# Patient Record
Sex: FEMALE | Race: BLACK OR AFRICAN AMERICAN | NOT HISPANIC OR LATINO | URBAN - METROPOLITAN AREA
[De-identification: names, ages, dates, MRNs, and addresses within clinical notes are randomized per-mention and may not be internally consistent; named-entity substitution may affect disease eponyms.]

---

## 2020-09-18 ENCOUNTER — INPATIENT (INPATIENT)
Facility: HOSPITAL | Age: 25
LOS: 0 days | Discharge: HOME | End: 2020-09-19
Attending: OBSTETRICS & GYNECOLOGY | Admitting: OBSTETRICS & GYNECOLOGY

## 2020-09-18 VITALS — TEMPERATURE: 98 F

## 2020-09-18 DIAGNOSIS — O26.899 OTHER SPECIFIED PREGNANCY RELATED CONDITIONS, UNSPECIFIED TRIMESTER: ICD-10-CM

## 2020-09-18 DIAGNOSIS — Z98.890 OTHER SPECIFIED POSTPROCEDURAL STATES: Chronic | ICD-10-CM

## 2020-09-18 DIAGNOSIS — Z3A.00 WEEKS OF GESTATION OF PREGNANCY NOT SPECIFIED: ICD-10-CM

## 2020-09-18 LAB
ALBUMIN SERPL ELPH-MCNC: 3.8 G/DL — SIGNIFICANT CHANGE UP (ref 3.3–5)
ALP SERPL-CCNC: 168 U/L — HIGH (ref 40–120)
ALT FLD-CCNC: 14 U/L — SIGNIFICANT CHANGE UP (ref 4–33)
ANION GAP SERPL CALC-SCNC: 15 MMO/L — HIGH (ref 7–14)
APPEARANCE UR: CLEAR — SIGNIFICANT CHANGE UP
APTT BLD: 28.3 SEC — SIGNIFICANT CHANGE UP (ref 27–36.3)
AST SERPL-CCNC: 21 U/L — SIGNIFICANT CHANGE UP (ref 4–32)
BACTERIA # UR AUTO: SIGNIFICANT CHANGE UP
BASOPHILS # BLD AUTO: 0.02 K/UL — SIGNIFICANT CHANGE UP (ref 0–0.2)
BASOPHILS NFR BLD AUTO: 0.2 % — SIGNIFICANT CHANGE UP (ref 0–2)
BILIRUB SERPL-MCNC: 0.2 MG/DL — SIGNIFICANT CHANGE UP (ref 0.2–1.2)
BILIRUB UR-MCNC: NEGATIVE — SIGNIFICANT CHANGE UP
BLD GP AB SCN SERPL QL: NEGATIVE — SIGNIFICANT CHANGE UP
BLOOD UR QL VISUAL: NEGATIVE — SIGNIFICANT CHANGE UP
BUN SERPL-MCNC: 10 MG/DL — SIGNIFICANT CHANGE UP (ref 7–23)
CALCIUM SERPL-MCNC: 9.3 MG/DL — SIGNIFICANT CHANGE UP (ref 8.4–10.5)
CHLORIDE SERPL-SCNC: 101 MMOL/L — SIGNIFICANT CHANGE UP (ref 98–107)
CO2 SERPL-SCNC: 20 MMOL/L — LOW (ref 22–31)
COLOR SPEC: YELLOW — SIGNIFICANT CHANGE UP
CREAT ?TM UR-MCNC: 268.9 MG/DL — SIGNIFICANT CHANGE UP
CREAT SERPL-MCNC: 0.58 MG/DL — SIGNIFICANT CHANGE UP (ref 0.5–1.3)
EOSINOPHIL # BLD AUTO: 0.04 K/UL — SIGNIFICANT CHANGE UP (ref 0–0.5)
EOSINOPHIL NFR BLD AUTO: 0.4 % — SIGNIFICANT CHANGE UP (ref 0–6)
FIBRINOGEN PPP-MCNC: 799 MG/DL — HIGH (ref 290–520)
GLUCOSE SERPL-MCNC: 88 MG/DL — SIGNIFICANT CHANGE UP (ref 70–99)
GLUCOSE UR-MCNC: NEGATIVE — SIGNIFICANT CHANGE UP
HCT VFR BLD CALC: 35 % — SIGNIFICANT CHANGE UP (ref 34.5–45)
HGB BLD-MCNC: 10.9 G/DL — LOW (ref 11.5–15.5)
HYALINE CASTS # UR AUTO: HIGH
IMM GRANULOCYTES NFR BLD AUTO: 0.6 % — SIGNIFICANT CHANGE UP (ref 0–1.5)
INR BLD: 0.98 — SIGNIFICANT CHANGE UP (ref 0.88–1.16)
KETONES UR-MCNC: SIGNIFICANT CHANGE UP
LDH SERPL L TO P-CCNC: 205 U/L — SIGNIFICANT CHANGE UP (ref 135–225)
LEUKOCYTE ESTERASE UR-ACNC: NEGATIVE — SIGNIFICANT CHANGE UP
LYMPHOCYTES # BLD AUTO: 1.88 K/UL — SIGNIFICANT CHANGE UP (ref 1–3.3)
LYMPHOCYTES # BLD AUTO: 20.2 % — SIGNIFICANT CHANGE UP (ref 13–44)
MCHC RBC-ENTMCNC: 25.5 PG — LOW (ref 27–34)
MCHC RBC-ENTMCNC: 31.1 % — LOW (ref 32–36)
MCV RBC AUTO: 82 FL — SIGNIFICANT CHANGE UP (ref 80–100)
MONOCYTES # BLD AUTO: 0.89 K/UL — SIGNIFICANT CHANGE UP (ref 0–0.9)
MONOCYTES NFR BLD AUTO: 9.5 % — SIGNIFICANT CHANGE UP (ref 2–14)
NEUTROPHILS # BLD AUTO: 6.43 K/UL — SIGNIFICANT CHANGE UP (ref 1.8–7.4)
NEUTROPHILS NFR BLD AUTO: 69.1 % — SIGNIFICANT CHANGE UP (ref 43–77)
NITRITE UR-MCNC: NEGATIVE — SIGNIFICANT CHANGE UP
NRBC # FLD: 0 K/UL — SIGNIFICANT CHANGE UP (ref 0–0)
PH UR: 6.5 — SIGNIFICANT CHANGE UP (ref 5–8)
PLATELET # BLD AUTO: 156 K/UL — SIGNIFICANT CHANGE UP (ref 150–400)
PMV BLD: 13.5 FL — HIGH (ref 7–13)
POTASSIUM SERPL-MCNC: 3.8 MMOL/L — SIGNIFICANT CHANGE UP (ref 3.5–5.3)
POTASSIUM SERPL-SCNC: 3.8 MMOL/L — SIGNIFICANT CHANGE UP (ref 3.5–5.3)
PROT SERPL-MCNC: 6.9 G/DL — SIGNIFICANT CHANGE UP (ref 6–8.3)
PROT UR-MCNC: 52.2 MG/DL — SIGNIFICANT CHANGE UP
PROT UR-MCNC: 70 — SIGNIFICANT CHANGE UP
PROTHROM AB SERPL-ACNC: 11.3 SEC — SIGNIFICANT CHANGE UP (ref 10.6–13.6)
RBC # BLD: 4.27 M/UL — SIGNIFICANT CHANGE UP (ref 3.8–5.2)
RBC # FLD: 16.1 % — HIGH (ref 10.3–14.5)
RBC CASTS # UR COMP ASSIST: SIGNIFICANT CHANGE UP (ref 0–?)
RH IG SCN BLD-IMP: POSITIVE — SIGNIFICANT CHANGE UP
RH IG SCN BLD-IMP: POSITIVE — SIGNIFICANT CHANGE UP
RUBV IGG SER-ACNC: 1.3 INDEX — SIGNIFICANT CHANGE UP
RUBV IGG SER-IMP: POSITIVE — SIGNIFICANT CHANGE UP
SARS-COV-2 RNA SPEC QL NAA+PROBE: SIGNIFICANT CHANGE UP
SODIUM SERPL-SCNC: 136 MMOL/L — SIGNIFICANT CHANGE UP (ref 135–145)
SP GR SPEC: 1.04 — SIGNIFICANT CHANGE UP (ref 1–1.04)
SQUAMOUS # UR AUTO: SIGNIFICANT CHANGE UP
T PALLIDUM AB TITR SER: NEGATIVE — SIGNIFICANT CHANGE UP
URATE SERPL-MCNC: 4.6 MG/DL — SIGNIFICANT CHANGE UP (ref 2.5–7)
UROBILINOGEN FLD QL: SIGNIFICANT CHANGE UP
WBC # BLD: 9.32 K/UL — SIGNIFICANT CHANGE UP (ref 3.8–10.5)
WBC # FLD AUTO: 9.32 K/UL — SIGNIFICANT CHANGE UP (ref 3.8–10.5)
WBC UR QL: SIGNIFICANT CHANGE UP (ref 0–?)

## 2020-09-18 RX ORDER — SODIUM CHLORIDE 9 MG/ML
1000 INJECTION, SOLUTION INTRAVENOUS
Refills: 0 | Status: DISCONTINUED | OUTPATIENT
Start: 2020-09-18 | End: 2020-09-18

## 2020-09-18 RX ORDER — IBUPROFEN 200 MG
600 TABLET ORAL EVERY 6 HOURS
Refills: 0 | Status: COMPLETED | OUTPATIENT
Start: 2020-09-18 | End: 2021-08-17

## 2020-09-18 RX ORDER — TETANUS TOXOID, REDUCED DIPHTHERIA TOXOID AND ACELLULAR PERTUSSIS VACCINE, ADSORBED 5; 2.5; 8; 8; 2.5 [IU]/.5ML; [IU]/.5ML; UG/.5ML; UG/.5ML; UG/.5ML
0.5 SUSPENSION INTRAMUSCULAR ONCE
Refills: 0 | Status: DISCONTINUED | OUTPATIENT
Start: 2020-09-18 | End: 2020-09-19

## 2020-09-18 RX ORDER — ACETAMINOPHEN 500 MG
975 TABLET ORAL
Refills: 0 | Status: DISCONTINUED | OUTPATIENT
Start: 2020-09-18 | End: 2020-09-19

## 2020-09-18 RX ORDER — LANOLIN
1 OINTMENT (GRAM) TOPICAL EVERY 6 HOURS
Refills: 0 | Status: DISCONTINUED | OUTPATIENT
Start: 2020-09-18 | End: 2020-09-19

## 2020-09-18 RX ORDER — DIBUCAINE 1 %
1 OINTMENT (GRAM) RECTAL EVERY 6 HOURS
Refills: 0 | Status: DISCONTINUED | OUTPATIENT
Start: 2020-09-18 | End: 2020-09-19

## 2020-09-18 RX ORDER — KETOROLAC TROMETHAMINE 30 MG/ML
30 SYRINGE (ML) INJECTION ONCE
Refills: 0 | Status: DISCONTINUED | OUTPATIENT
Start: 2020-09-18 | End: 2020-09-18

## 2020-09-18 RX ORDER — OXYCODONE HYDROCHLORIDE 5 MG/1
5 TABLET ORAL
Refills: 0 | Status: DISCONTINUED | OUTPATIENT
Start: 2020-09-18 | End: 2020-09-19

## 2020-09-18 RX ORDER — HYDROCORTISONE 1 %
1 OINTMENT (GRAM) TOPICAL EVERY 6 HOURS
Refills: 0 | Status: DISCONTINUED | OUTPATIENT
Start: 2020-09-18 | End: 2020-09-19

## 2020-09-18 RX ORDER — AMPICILLIN TRIHYDRATE 250 MG
2 CAPSULE ORAL ONCE
Refills: 0 | Status: COMPLETED | OUTPATIENT
Start: 2020-09-18 | End: 2020-09-18

## 2020-09-18 RX ORDER — SIMETHICONE 80 MG/1
80 TABLET, CHEWABLE ORAL EVERY 4 HOURS
Refills: 0 | Status: DISCONTINUED | OUTPATIENT
Start: 2020-09-18 | End: 2020-09-19

## 2020-09-18 RX ORDER — SODIUM CHLORIDE 9 MG/ML
3 INJECTION INTRAMUSCULAR; INTRAVENOUS; SUBCUTANEOUS EVERY 8 HOURS
Refills: 0 | Status: DISCONTINUED | OUTPATIENT
Start: 2020-09-18 | End: 2020-09-19

## 2020-09-18 RX ORDER — BENZOCAINE 10 %
1 GEL (GRAM) MUCOUS MEMBRANE EVERY 6 HOURS
Refills: 0 | Status: DISCONTINUED | OUTPATIENT
Start: 2020-09-18 | End: 2020-09-19

## 2020-09-18 RX ORDER — IBUPROFEN 200 MG
600 TABLET ORAL EVERY 6 HOURS
Refills: 0 | Status: DISCONTINUED | OUTPATIENT
Start: 2020-09-18 | End: 2020-09-19

## 2020-09-18 RX ORDER — IBUPROFEN 200 MG
1 TABLET ORAL
Qty: 0 | Refills: 0 | DISCHARGE
Start: 2020-09-18

## 2020-09-18 RX ORDER — INFLUENZA VIRUS VACCINE 15; 15; 15; 15 UG/.5ML; UG/.5ML; UG/.5ML; UG/.5ML
0.5 SUSPENSION INTRAMUSCULAR ONCE
Refills: 0 | Status: DISCONTINUED | OUTPATIENT
Start: 2020-09-18 | End: 2020-09-19

## 2020-09-18 RX ORDER — OXYCODONE HYDROCHLORIDE 5 MG/1
5 TABLET ORAL ONCE
Refills: 0 | Status: DISCONTINUED | OUTPATIENT
Start: 2020-09-18 | End: 2020-09-19

## 2020-09-18 RX ORDER — OXYTOCIN 10 UNIT/ML
333.33 VIAL (ML) INJECTION
Qty: 20 | Refills: 0 | Status: DISCONTINUED | OUTPATIENT
Start: 2020-09-18 | End: 2020-09-19

## 2020-09-18 RX ORDER — AMPICILLIN TRIHYDRATE 250 MG
1 CAPSULE ORAL EVERY 4 HOURS
Refills: 0 | Status: DISCONTINUED | OUTPATIENT
Start: 2020-09-18 | End: 2020-09-18

## 2020-09-18 RX ORDER — ACETAMINOPHEN 500 MG
3 TABLET ORAL
Qty: 0 | Refills: 0 | DISCHARGE
Start: 2020-09-18

## 2020-09-18 RX ORDER — PRAMOXINE HYDROCHLORIDE 150 MG/15G
1 AEROSOL, FOAM RECTAL EVERY 4 HOURS
Refills: 0 | Status: DISCONTINUED | OUTPATIENT
Start: 2020-09-18 | End: 2020-09-19

## 2020-09-18 RX ORDER — AER TRAVELER 0.5 G/1
1 SOLUTION RECTAL; TOPICAL EVERY 4 HOURS
Refills: 0 | Status: DISCONTINUED | OUTPATIENT
Start: 2020-09-18 | End: 2020-09-19

## 2020-09-18 RX ORDER — DIPHENHYDRAMINE HCL 50 MG
25 CAPSULE ORAL EVERY 6 HOURS
Refills: 0 | Status: DISCONTINUED | OUTPATIENT
Start: 2020-09-18 | End: 2020-09-19

## 2020-09-18 RX ORDER — MAGNESIUM HYDROXIDE 400 MG/1
30 TABLET, CHEWABLE ORAL
Refills: 0 | Status: DISCONTINUED | OUTPATIENT
Start: 2020-09-18 | End: 2020-09-19

## 2020-09-18 RX ADMIN — AER TRAVELER 1 APPLICATION(S): 0.5 SOLUTION RECTAL; TOPICAL at 23:38

## 2020-09-18 RX ADMIN — Medication 1 APPLICATION(S): at 23:38

## 2020-09-18 RX ADMIN — Medication 1000 MILLIUNIT(S)/MIN: at 19:50

## 2020-09-18 RX ADMIN — Medication 108 GRAM(S): at 15:06

## 2020-09-18 RX ADMIN — SODIUM CHLORIDE 125 MILLILITER(S): 9 INJECTION, SOLUTION INTRAVENOUS at 07:06

## 2020-09-18 RX ADMIN — Medication 216 GRAM(S): at 07:06

## 2020-09-18 RX ADMIN — Medication 1 SPRAY(S): at 23:38

## 2020-09-18 RX ADMIN — Medication 30 MILLIGRAM(S): at 19:50

## 2020-09-18 RX ADMIN — Medication 30 MILLIGRAM(S): at 20:20

## 2020-09-18 RX ADMIN — Medication 108 GRAM(S): at 18:55

## 2020-09-18 RX ADMIN — PRAMOXINE HYDROCHLORIDE 1 APPLICATION(S): 150 AEROSOL, FOAM RECTAL at 23:39

## 2020-09-18 RX ADMIN — SODIUM CHLORIDE 3 MILLILITER(S): 9 INJECTION INTRAMUSCULAR; INTRAVENOUS; SUBCUTANEOUS at 22:47

## 2020-09-18 RX ADMIN — Medication 108 GRAM(S): at 10:54

## 2020-09-18 RX ADMIN — SODIUM CHLORIDE 125 MILLILITER(S): 9 INJECTION, SOLUTION INTRAVENOUS at 10:54

## 2020-09-18 NOTE — OB PROVIDER TRIAGE NOTE - NSHPPHYSICALEXAM_GEN_ALL_CORE
Vital Signs Last 24 Hrs  T(C): 36.8 (18 Sep 2020 06:46), Max: 36.8 (18 Sep 2020 06:39)  T(F): 98.2 (18 Sep 2020 06:46), Max: 98.24 (18 Sep 2020 06:39)  HR: 72 (18 Sep 2020 06:55) (72 - 75)  BP: 121/63 (18 Sep 2020 06:55) (121/63 - 139/84)  RR: 17 (18 Sep 2020 06:46) (17 - 17)  TAS: cephalic presentation  FHR: 135 baseline, minimum variability, no accelerations, no decelerations  CTX: 3 to 4 minutes apart  SVE: 4/90/-3  TAS: cephalic presentation

## 2020-09-18 NOTE — DISCHARGE NOTE OB - PATIENT PORTAL LINK FT
You can access the FollowMyHealth Patient Portal offered by [facility] by registering at the following website: [email or web address]. By joining [employer]’s FollowMyHealth portal, you will also be able to view your health information using other applications (apps) compatible with our system.

## 2020-09-18 NOTE — OB PROVIDER DELIVERY SUMMARY - NSPROVIDERDELIVERYNOTE_OBGYN_ALL_OB_FT
pt fully dilated with urge to push  pushed w/ contractions  head delivered in PANCHO position, no nuchal cord noted  anterior shoulder delivered, followed by posterior shoulder and body  mouth suctioned, baby placed onto mother's abdomen  cord clamped x 2 and cut  baby handed to awaiting pediatricians  placenta delivered intact, uterus firmed at umbilical level, pitocin started IV  1st degrees vaginal laceration repaired w lidocaine and chromic suture    baby girl, apgars 9/9, weight 3375g, ebl 200cc

## 2020-09-18 NOTE — DISCHARGE NOTE OB - MEDICATION SUMMARY - MEDICATIONS TO TAKE
I will START or STAY ON the medications listed below when I get home from the hospital:    acetaminophen 325 mg oral tablet  -- 3 tab(s) by mouth   -- Indication: For vaginal delivery    ibuprofen 600 mg oral tablet  -- 1 tab(s) by mouth every 6 hours  -- Indication: For vaginal delivery    PNV Prenatal oral tablet  -- 1 tab(s) by mouth once a day  -- Indication: For vaginal delivery   I will START or STAY ON the medications listed below when I get home from the hospital:    ibuprofen 600 mg oral tablet  -- 1 tab(s) by mouth every 6 hours, As Needed  -- Indication: For pain    acetaminophen 325 mg oral tablet  -- 3 tab(s) by mouth , As Needed  -- Indication: For pain    PNV Prenatal oral tablet  -- 1 tab(s) by mouth once a day  -- Indication: For vaginal delivery

## 2020-09-18 NOTE — DISCHARGE NOTE OB - CARE PLAN
Principal Discharge DX:	Vaginal delivery  Goal:	post partum recovery  Assessment and plan of treatment:	23 y/o now P1 admitted in labor  uncomplicated labor course and delivery  uncomplicated post partum course  d/c home stable on PPD#1

## 2020-09-18 NOTE — CHART NOTE - NSCHARTNOTEFT_GEN_A_CORE
Patient seen and checked at bedside.     SVE: 7.5/90/-1.   FHR: 145/moderate variability/-accels/-decels  [place]: irregular     AROM 4:00 pm, light [facility]     ABRAN Wilson, PGY1  D/w Dr. Mejia

## 2020-09-18 NOTE — OB RN DELIVERY SUMMARY - NS_SEPSISRSKCALC_OBGYN_ALL_OB_FT
EOS calculated successfully. EOS Risk Factor: 0.5/1000 live births ([facility] national incidence); GA=40w4d; Temp=98.78; ROM=3.317; GBS='Positive'; Antibiotics='GBS specific antibiotics > 2 hrs prior to birth'

## 2020-09-18 NOTE — CHART NOTE - NSCHARTNOTEFT_GEN_A_CORE
Patient seen and evaluated at bedside. She feels slightly more rectal pressure but is overall comfortable.     SVE: 6.5/80/-3  [place]: irregular   FHR: 130/mod obed/-accels/-decels    Plan:   - AROM   - Pitocin to establish regular contraction pattern     ABRAN Wilson, PGY1  D/w Dr. Mejia

## 2020-09-18 NOTE — OB PROVIDER H&P - NSHPPHYSICALEXAM_GEN_ALL_CORE
Vital Signs Last 24 Hrs  T(C): 36.8 (18 Sep 2020 06:46), Max: 36.8 (18 Sep 2020 06:39)  T(F): 98.2 (18 Sep 2020 06:46), Max: 98.24 (18 Sep 2020 06:39)  HR: 72 (18 Sep 2020 06:55) (72 - 75)  BP: 121/63 (18 Sep 2020 06:55) (121/63 - 139/84)  RR: 17 (18 Sep 2020 06:46) (17 - 17)  TAS: cephalic presentation  FHR: 135 baseline, minimum variability, no accelerations, no decelerations  CTX: 3 to 4 minutes apart  SVE: 4/90/-3  TAS: cephalic presentation Vital Signs Last 24 Hrs  T(C): 36.8 (18 Sep 2020 06:46), Max: 36.8 (18 Sep 2020 06:39)  T(F): 98.2 (18 Sep 2020 06:46), Max: 98.24 (18 Sep 2020 06:39)  HR: 72 (18 Sep 2020 06:55) (72 - 75)  BP: 121/63 (18 Sep 2020 06:55) (121/63 - 139/84)  Patient denies symptoms related to PEC.   RR: 17 (18 Sep 2020 06:46) (17 - 17)  TAS: cephalic presentation  FHR: 135 baseline, minimum variability, no accelerations, no decelerations  CTX: 3 to 4 minutes apart  SVE: 4/90/-3  TAS: cephalic presentation

## 2020-09-18 NOTE — OB NEONATOLOGY/PEDIATRICIAN DELIVERY SUMMARY - NSPEDSNEONOTESA_OBGYN_ALL_OB_FT
Pediatrician called to delivery for meconium. Female infant born at 40.4wks via  to a 27 y/o  blood type A+ mother. No significant maternal or prenatal history. Prenatal labs nr/immune/-, GBS + on  treated with amp x4. ROM at 1553 on  with meconium fluids. Baby emerged vigorous, crying. Cord clamping delayed 30 sec. Infant was brought to radiant warmer and warmed, dried, stimulated and suctioned. HR>100, normal respiratory effort. APGARS of 9 and 9. Mom is initiating breast feeding. Consents to Hepatitis B vaccination. EOS score 0.08.     Physical Exam:  Gen: NAD, +grimace  HEENT: anterior fontanel open soft and flat, no cleft lip/palate, ears normal set, no ear pits or tags. no lesions in mouth/throat, nares clinically patent  Resp: no increased work of breathing, good air entry b/l, clear to auscultation bilaterally  Cardio: Normal S1/S2, regular rate and rhythm, no murmurs, rubs or gallops  Abd: soft, non tender, non distended, + bowel sounds, umbilical cord with 3 vessels  Neuro: +grasp/suck/onur, normal tone  Extremities: negative barrios and ortolani, moving all extremities, full range of motion x 4, no crepitus  Skin: pink, warm  Genitals:[Normal female anatomy] [Normal male anatomy, testicles palpable in scrotum b/l], Toni 1, anus patent Pediatrician called to delivery for meconium. Female infant born at 40.4wks via  to a 25 y/o  blood type A+ mother. No significant maternal or prenatal history. Prenatal labs nr/immune/-, GBS + on  treated with amp x4. ROM at 1553 on  with meconium fluids. Baby emerged vigorous, crying. Cord clamping delayed 30 sec. Infant was brought to radiant warmer and warmed, dried, stimulated and suctioned. HR>100, normal respiratory effort. APGARS of 9 and 9. Mom is initiating breast feeding. Consents to Hepatitis B vaccination. EOS score 0.08.     Physical Exam:  Gen: NAD, +grimace  HEENT: anterior fontanel open soft and flat, no cleft lip/palate, ears normal set, no ear pits or tags. no lesions in mouth/throat, nares clinically patent  Resp: no increased work of breathing, good air entry b/l, clear to auscultation bilaterally  Cardio: Normal S1/S2, regular rate and rhythm, no murmurs, rubs or gallops  Abd: soft, non tender, non distended, + bowel sounds, umbilical cord with 3 vessels  Neuro: +grasp/suck/onur, normal tone  Extremities: negative barrios and ortolani, moving all extremities, full range of motion x 4, no crepitus  Skin: pink, warm  Genitals: Normal female anatomy, Toni 1, anus patent

## 2020-09-18 NOTE — DISCHARGE NOTE OB - CARE PROVIDER_API CALL
Kohanim, Behnam  OBSTETRICS AND GYNECOLOGY  [address], [address]  [place] [address]  Phone: [phone]  Fax: [phone]  Follow Up Time:

## 2020-09-18 NOTE — OB PROVIDER TRIAGE NOTE - NSOBPROVIDERNOTE_OBGYN_ALL_OB_FT
Evidence of labor  - admit to labor and delivery, d/w   - GBS positive, for Ampicillin  - patient does not want epidural plain management at this time. Please call  for pain management.

## 2020-09-18 NOTE — OB PROVIDER TRIAGE NOTE - HISTORY OF PRESENT ILLNESS
's patient is a EDC 2020 EGA 40   reports of painful contractions every 5 minutes. Patient reports of fetal movement. Denies loss of fluid, vaginal bleeding.     AP complications: Denies  Medical History: Denies  Surgical History: D&C x 2  OBGYN History: TOP x 2

## 2020-09-18 NOTE — DISCHARGE NOTE OB - PLAN OF CARE
post partum recovery 23 y/o now P1 admitted in labor  uncomplicated labor course and delivery  uncomplicated post partum course  d/c home stable on PPD#1

## 2020-09-18 NOTE — PROGRESS NOTE ADULT - SUBJECTIVE AND OBJECTIVE BOX
23 y/o P0 at 40wks+4/7 admitted in early labor this morning  GBS pos, on ampicillin  efw 3400g    pt w/ epidural in place, comfortable  feeling slight vaginal/rectal pressure    vitals  /68 P85 RR15 T98.4 O2sat 100%RA  FHT 140s/min-mod variability/ pos accels, no decels  toco ctx q 2-3mins  VE 9/90/0, thick mec noted    plan  close labor monitoring  cont fht/toco/IVFs  cont amp for GBS pos status  re-examine in 1 hour or prn

## 2020-09-18 NOTE — DISCHARGE NOTE OB - HOSPITAL COURSE
25 y/o now P1 admitted in labor at 40wks+  uncomplicated labor course and   uncomplicated post partum course  d/c home PPD#1 in stable condition

## 2020-09-18 NOTE — DISCHARGE NOTE OB - MATERIALS PROVIDED
[facility] [place] Screening Program/Bottle Feeding Log/Shaken Baby Prevention Handout/Birth Certificate Instructions/Vaccinations/  Immunization Record/Breastfeeding Log/Back To Sleep Handout/MMR Vaccination (VIS Pub Date: 2012)/[facility] Hearing Screen Program/Breastfeeding Guide and Packet/Breastfeeding Mother’s Support Group Information/Guide to Postpartum Care

## 2020-09-18 NOTE — OB PROVIDER H&P - HISTORY OF PRESENT ILLNESS
's patient is a EDC 2020 EGA 40   reports of painful contractions every 5 minutes. Patient reports of fetal movement. Denies loss of fluid, vaginal bleeding.     AP complications: Denies  Medical History: Denies  Surgical History: D&C x 2  OBGYN History: TOP x 2 's patient is a black female EDC 2020 EGA 40 4/  reports of painful contractions every 5 minutes. Patient reports of fetal movement. Denies loss of fluid, vaginal bleeding. GBS status: Negative 2020    AP complications: Denies  Medical History: Denies  Surgical History: D&C x 2  OBGYN History: TOP x 2 's patient is a 23 y/o black female EDC 2020  EGA 40   reports of painful contractions every 5 minutes. Patient reports of fetal movement. Denies loss of fluid, vaginal bleeding. GBS status: Negative 2020    AP complications: Denies  Medical History: Denies  Surgical History: D&C x 2  OBGYN History: TOP x 2

## 2020-09-19 VITALS
SYSTOLIC BLOOD PRESSURE: 131 MMHG | TEMPERATURE: 99 F | RESPIRATION RATE: 18 BRPM | OXYGEN SATURATION: 99 % | HEART RATE: 89 BPM | DIASTOLIC BLOOD PRESSURE: 74 MMHG

## 2020-09-19 RX ADMIN — Medication 975 MILLIGRAM(S): at 18:10

## 2020-09-19 RX ADMIN — Medication 600 MILLIGRAM(S): at 13:53

## 2020-09-19 RX ADMIN — Medication 975 MILLIGRAM(S): at 03:25

## 2020-09-19 RX ADMIN — Medication 975 MILLIGRAM(S): at 02:25

## 2020-09-19 RX ADMIN — SODIUM CHLORIDE 3 MILLILITER(S): 9 INJECTION INTRAMUSCULAR; INTRAVENOUS; SUBCUTANEOUS at 05:44

## 2020-09-19 RX ADMIN — SODIUM CHLORIDE 3 MILLILITER(S): 9 INJECTION INTRAMUSCULAR; INTRAVENOUS; SUBCUTANEOUS at 14:00

## 2020-09-19 RX ADMIN — Medication 975 MILLIGRAM(S): at 18:40

## 2020-09-19 RX ADMIN — Medication 1 TABLET(S): at 13:53

## 2020-09-19 RX ADMIN — Medication 600 MILLIGRAM(S): at 14:23

## 2020-09-19 NOTE — LACTATION INITIAL EVALUATION - LACTATION INTERVENTIONS
Mother concerned infant isn't getting anything.  Educated parents on how milk is produced.  Reviewed pages 35-45 in the Post Partum book.  Encouraged to feed on cue and to keep feeding log.  Assisted with deeper latch.  Shown "sandwich" technique.  Benefits of safes skin to skin and exclusive breastfeeding reviewed./initiate skin to skin/initiate hand expression routine

## 2020-09-19 NOTE — PROGRESS NOTE ADULT - SUBJECTIVE AND OBJECTIVE BOX
S: 23yo p1 s/p  yesterday baby girl . Her pain is well controlled. She is tolerating a regular diet and passing flatus. Denies N/V. Denies CP/SOB/lightheadedness/dizziness.   She is ambulating without difficulty.   Voiding spontaneously. breastfeeding    O:   Vital Signs Last 24 Hrs  T(C): 36.8 (19 Sep 2020 10:29), Max: 37.1 (18 Sep 2020 15:06)  T(F): 98.2 (19 Sep 2020 10:29), Max: 98.78 (18 Sep 2020 15:06)  HR: 69 (19 Sep 2020 10:29) (63 - 140)  BP: 110/64 (19 Sep 2020 10:29) (102/57 - 154/78)  BP(mean): --  RR: 18 (19 Sep 2020 10:29) (16 - 18)  SpO2: 100% (19 Sep 2020 10:29) (97% - 100%)    Labs:  Blood type: A Positive  Rubella IgG: Positive ( @ 09:16)  RPR: Negative                          10.9<L>   9.32 >-----------< 156    (  @ 07:30 )             35.0    20 @ 07:30      136  |  101  |  10  ----------------------------<  88  3.8   |  20<L>  |  0.58        Ca    9.3      18 Sep 2020 07:30    TPro  6.9  /  Alb  3.8  /  TBili  0.2  /  DBili  x   /  AST  21  /  ALT  14  /  AlkPhos  168<H>  20 @ 07:30          PE:  General: NAD  Abdomen: Mildly distended, appropriately tender, incision c/d/i.  Extremities: No erythema, no pitting edema    A/P: 23yo ppd#1 s/p   doing well    - Continue regular diet.  - Increase ambulation.  dc home  fup in 6 wks with dr guo

## 2020-10-25 NOTE — OB RN PATIENT PROFILE - WEIGHT PRE-PREGNANCY IN KG
---nephrology following    10/22/2020  Worsening renal failure today. Bladder scan shows mild retention at 298ml. In and out catheter today. Will repeat bladder scan in 6 hours.   --renal ultrasound ordered    10/23/20 Creatinine worsened overnight from 8.9 to 10, Nephrology following. Noe placed overnight for urinary retention. Patient drained 2 liters of bloody urine overnight. Urology consulted and recommended continuous bladder irrigation. Will continue to monitor renal function.   10/24/20 Creatinine increased to 10.8 overnight, Nephrology following. Continuous bladder irrigation was stopped per Urology. No further hematuria noted.   79

## 2021-12-14 NOTE — LACTATION INITIAL EVALUATION - INTERVENTION OUTCOME
No Continue to follow and assist as needed./verbalizes understanding/demonstrates understanding of teaching

## 2022-01-06 NOTE — OB RN PATIENT PROFILE - NSTRANFUSIONOBJECTION_GEN_ALL_CORE_SIUH
Bed Mobility / Transfer Training / Gait Training 20 min/independent
Patient has no objection to blood transfusions.

## 2023-03-23 NOTE — OB PROVIDER H&P - LABOR: CERVICAL CONSISTENCY
soft Eucrisa Counseling: Patient may experience a mild burning sensation during topical application. Eucrisa is not approved in children less than 3 months of age.

## 2024-08-19 NOTE — OB PROVIDER H&P - NSSCHADMISSION_OBGYN_A_OB
Refill Authorization Note     Refill Decision Note   Bree Gonzales  is requesting a refill authorization.  Brief Assessment and Rationale for Refill:  Approve     Medication Therapy Plan:       Medication Reconciliation Completed: No   Comments:     No Care Gaps recommended.     Note composed:10:36 PM 08/18/2024           No

## 2025-03-18 PROBLEM — Z33.2 ENCOUNTER FOR ELECTIVE TERMINATION OF PREGNANCY: Chronic | Status: ACTIVE | Noted: 2020-09-18

## 2025-04-22 PROBLEM — Z00.00 ENCOUNTER FOR PREVENTIVE HEALTH EXAMINATION: Status: ACTIVE | Noted: 2025-04-22

## 2025-04-29 ENCOUNTER — ASOB RESULT (OUTPATIENT)
Age: 30
End: 2025-04-29

## 2025-04-29 ENCOUNTER — APPOINTMENT (OUTPATIENT)
Dept: ANTEPARTUM | Facility: CLINIC | Age: 30
End: 2025-04-29
Payer: COMMERCIAL

## 2025-04-29 PROCEDURE — 76811 OB US DETAILED SNGL FETUS: CPT

## 2025-07-17 ENCOUNTER — APPOINTMENT (OUTPATIENT)
Dept: ANTEPARTUM | Facility: CLINIC | Age: 30
End: 2025-07-17

## 2025-07-17 ENCOUNTER — OUTPATIENT (OUTPATIENT)
Dept: INPATIENT UNIT | Facility: HOSPITAL | Age: 30
LOS: 1 days | End: 2025-07-17
Payer: COMMERCIAL

## 2025-07-17 VITALS — SYSTOLIC BLOOD PRESSURE: 117 MMHG | HEART RATE: 74 BPM | DIASTOLIC BLOOD PRESSURE: 57 MMHG

## 2025-07-17 VITALS
TEMPERATURE: 98 F | DIASTOLIC BLOOD PRESSURE: 58 MMHG | OXYGEN SATURATION: 99 % | SYSTOLIC BLOOD PRESSURE: 128 MMHG | HEART RATE: 97 BPM | RESPIRATION RATE: 15 BRPM

## 2025-07-17 DIAGNOSIS — Z98.890 OTHER SPECIFIED POSTPROCEDURAL STATES: Chronic | ICD-10-CM

## 2025-07-17 DIAGNOSIS — O36.8390 MATERNAL CARE FOR ABNORMALITIES OF THE FETAL HEART RATE OR RHYTHM, UNSPECIFIED TRIMESTER, NOT APPLICABLE OR UNSPECIFIED: ICD-10-CM

## 2025-07-17 LAB
APPEARANCE UR: ABNORMAL
BACTERIA # UR AUTO: ABNORMAL /HPF
BILIRUB UR-MCNC: NEGATIVE — SIGNIFICANT CHANGE UP
CAST: 5 /LPF — HIGH (ref 0–4)
COLOR SPEC: YELLOW — SIGNIFICANT CHANGE UP
DIFF PNL FLD: NEGATIVE — SIGNIFICANT CHANGE UP
GLUCOSE UR QL: NEGATIVE MG/DL — SIGNIFICANT CHANGE UP
KETONES UR QL: NEGATIVE MG/DL — SIGNIFICANT CHANGE UP
LEUKOCYTE ESTERASE UR-ACNC: ABNORMAL
NITRITE UR-MCNC: NEGATIVE — SIGNIFICANT CHANGE UP
PH UR: 6 — SIGNIFICANT CHANGE UP (ref 5–8)
PROT UR-MCNC: 30 MG/DL
RBC CASTS # UR COMP ASSIST: 3 /HPF — SIGNIFICANT CHANGE UP (ref 0–4)
REVIEW: SIGNIFICANT CHANGE UP
SP GR SPEC: 1.03 — HIGH (ref 1–1.03)
SQUAMOUS # UR AUTO: 11 /HPF — HIGH (ref 0–5)
UROBILINOGEN FLD QL: 1 MG/DL — SIGNIFICANT CHANGE UP (ref 0.2–1)
WBC UR QL: 60 /HPF — HIGH (ref 0–5)

## 2025-07-17 PROCEDURE — 59025 FETAL NON-STRESS TEST: CPT | Mod: 26

## 2025-07-17 PROCEDURE — 99212 OFFICE O/P EST SF 10 MIN: CPT | Mod: 25

## 2025-07-17 RX ORDER — SODIUM CHLORIDE 9 G/1000ML
1000 INJECTION, SOLUTION INTRAVENOUS ONCE
Refills: 0 | Status: COMPLETED | OUTPATIENT
Start: 2025-07-17 | End: 2025-07-17

## 2025-07-17 RX ADMIN — SODIUM CHLORIDE 1000 MILLILITER(S): 9 INJECTION, SOLUTION INTRAVENOUS at 22:07

## 2025-07-17 NOTE — OB PROVIDER TRIAGE NOTE - HISTORY OF PRESENT ILLNESS
PNC: Dr Palafox    30 y/o  @31.5 presents with c/o decreased fetal movement since 1pm. At time of HPI pt reports +FM. Denies LOF, VB, contractions and/ cramping.    Allergies: NKA  Meds: PNV    Antepartum History:  -uncomplicated     PNC: [facility]    28 y/o  @31.5 presents with c/o decreased fetal movement since 1pm. At time of HPI pt reports +FM. Denies LOF, VB, contractions and/ cramping.    Allergies: NKA  Meds: PNV    Antepartum History:  -uncomplicated

## 2025-07-17 NOTE — OB PROVIDER TRIAGE NOTE - NSOBPROVIDERNOTE_OBGYN_ALL_OB_FT
Pt is a 29y  encounter for decreased fetal movment  - Discussed with Dr. Nicolas  - At time of discharge patient reports +FM and denies ctx. Pt offers no acute complaints. Maternal and fetal status reassuring. Pt encouraged to PO hydrate  - Patient to be discharged home with follow up and return precautions. Fetal kick count instructions given.   - Please follow up with your obstetrician at your next scheduled appointment, 25  - Please return for decreased/no fetal movement, vaginal bleeding similar to that of a period, leaking/gush of fluid, regular contractions or requiring pain medication   - Patient educated of plan and demonstrate understanding. All questions answered. Discharge instructions provided and signed. Patient verbalized understanding of   discharge plan.  - Discharged at 2340    K Elio LOPEZ

## 2025-07-17 NOTE — OB PROVIDER TRIAGE NOTE - ADDITIONAL INSTRUCTIONS
At time of discharge patient reports +FM and denies ctx. Pt offers no acute complaints. Maternal and fetal status reassuring. Pt encouraged to PO hydrate  - Patient to be discharged home with follow up and return precautions. Fetal kick count instructions given.   - Please follow up with your obstetrician at your next scheduled appointment, 7/23/25  - Please return for decreased/no fetal movement, vaginal bleeding similar to that of a period, leaking/gush of fluid, regular contractions or requiring pain medication   - Patient educated of plan and demonstrate understanding. All questions answered. Discharge instructions provided and signed. Patient verbalized understanding of   discharge plan.

## 2025-07-17 NOTE — OB PROVIDER TRIAGE NOTE - NSHPPHYSICALEXAM_GEN_ALL_CORE
T(C): 36.9 (07-17-25 @ 20:25), Max: 36.9 (07-17-25 @ 20:25)  HR: 84 (07-17-25 @ 21:43) (81 - 97)  BP: 123/64 (07-17-25 @ 21:43) (99/64 - 128/58)  RR: 15 (07-17-25 @ 20:25) (15 - 15)  SpO2: 99% (07-17-25 @ 20:25) (99% - 99%)    Neuro: No facial asymmetry, no slurred speech, moves all 4 extremities  Mood: Alert and lucid, appropriate mood and affect  Head: Normocephalic. Atraumatic.   Heart: Regular rate and rhythm.  Lungs: No respiratory distress.  Abdomen: Soft, nontender. Gravid.   TAUS:  Sono saved in ASOB.   NST  Baseline  ( 135 ) BPM  Variability ( x)  Moderate   (  ) Minimal  (  ) Absent  (  )  Marked  Accelerations (  ) 15x15   (  ) 10x10  (  ) no  Decelerations (  ) no  (  ) Variable  (  ) Early  (  ) Late      Description ________  Contractions (  ) no  ( ) yes     Description  __________  Interpretation ( ) reactive   (  )  non-reactive  SSE: No erythema, edema, lesions to external genitalia. Physiologic discharge present. No active, brisk bleeding.  Negative pooling. Negative Nitrazine. Negative Fern.   SVE:   Exam chaperoned by:   ISABEL:  jaleel T(C): 36.9 (07-17-25 @ 20:25), Max: 36.9 (07-17-25 @ 20:25)  HR: 84 (07-17-25 @ 21:43) (81 - 97)  BP: 123/64 (07-17-25 @ 21:43) (99/64 - 128/58)  RR: 15 (07-17-25 @ 20:25) (15 - 15)  SpO2: 99% (07-17-25 @ 20:25) (99% - 99%)    Neuro: No facial asymmetry, no slurred speech, moves all 4 extremities  Mood: Alert and lucid, appropriate mood and affect  Head: Normocephalic. Atraumatic.   Heart: Regular rate and rhythm.  Lungs: No respiratory distress.  Abdomen: Soft, nontender. Gravid.   TAUS: Cephalic, posterior placenta, mmode 140, JENNIFER 14.82, BPP 8/8. Sono saved in ASOB.   NST  Baseline  ( 135 ) BPM  Variability ( x)  Moderate   (  ) Minimal  (  ) Absent  (  )  Marked  Accelerations ( x ) 15x15   (  ) 10x10  (  ) no  Decelerations ( x ) no  (  ) Variable  (  ) Early  (  ) Late      Description _cat I_______  Contractions (  ) no  (x ) yes     Description  irregular__________  Interpretation ( x) reactive   (  )  non-reactive  SVE: 0/0/-3  Exam chaperoned by: unavailable

## 2025-07-17 NOTE — OB RN TRIAGE NOTE - AVIAN FLU SYMPTOMS
Lumbar Puncture (Spinal Tap)        Discharge Instructions   Care Instructions       You received local anesthesia during your lumbar puncture. As the anesthesia wears off, you may feel some pain and discomfort from your procedure. The area where your procedure was performed may be sore or bruised.        Follow-up care is the key part of your treatment and safety. Be sure to make and go to all appointments and call your doctor if you are having problems.        Activity       You may start taking a bath or shower 24 hours after your procedure.       You need to rest laying down for a few hours following your procedure. After resting you may get up as tolerated, but may not do any strenuous activity.       For 24 hours after your procedure    avoid strenuous activity and bending at the waist.    Try not to cough, sneeze, strain or make quick movements.        Diet       You can eat your normal diet as tolerated.        Drink extra fluids, at least one extra cup of fluid every other hour until bedtime.       Medications       You may take your normal prescription medications as your doctor has instructed you to do.      If you are not taking any pain medications, ask your doctor if you may take an over-the-counter medication as needed for your pain.        Special Instructions       You may remove the band aid from your procedure site 24 hours after the procedure. If band aid becomes soiled,  you may replace it.        You will receive test results from the ordering physician.       You will receive follow up care from the ordering physician.       When should I call for help?       Call 911 if you have any of the following signs or symptoms   Shortness of breath   Chest pain   Loss of consciousness             Call 911 anytime you think you need emergency care.        Call the Interventional Radiologist if any of the following signs and symptoms occur:   Fever (greater than 100.6°F)   Continuous vomiting   Severe 
No

## 2025-07-21 DIAGNOSIS — O26.899 OTHER SPECIFIED PREGNANCY RELATED CONDITIONS, UNSPECIFIED TRIMESTER: ICD-10-CM

## 2025-07-21 DIAGNOSIS — Z3A.31 31 WEEKS GESTATION OF PREGNANCY: ICD-10-CM

## 2025-07-21 DIAGNOSIS — O36.8130 DECREASED FETAL MOVEMENTS, THIRD TRIMESTER, NOT APPLICABLE OR UNSPECIFIED: ICD-10-CM
